# Patient Record
Sex: FEMALE | Race: WHITE | NOT HISPANIC OR LATINO | Employment: FULL TIME | URBAN - NONMETROPOLITAN AREA
[De-identification: names, ages, dates, MRNs, and addresses within clinical notes are randomized per-mention and may not be internally consistent; named-entity substitution may affect disease eponyms.]

---

## 2023-03-17 ENCOUNTER — HOSPITAL ENCOUNTER (EMERGENCY)
Facility: HOSPITAL | Age: 28
Discharge: HOME | End: 2023-03-18
Attending: STUDENT IN AN ORGANIZED HEALTH CARE EDUCATION/TRAINING PROGRAM

## 2023-03-17 ENCOUNTER — APPOINTMENT (EMERGENCY)
Dept: CT IMAGING | Facility: HOSPITAL | Age: 28
End: 2023-03-17

## 2023-03-17 DIAGNOSIS — N93.9 VAGINAL BLEEDING: Primary | ICD-10-CM

## 2023-03-17 LAB
ABO GROUP BLD: NORMAL
ABO GROUP BLD: NORMAL
ALBUMIN SERPL BCP-MCNC: 3.4 G/DL (ref 3.5–5)
ALP SERPL-CCNC: 97 U/L (ref 34–104)
ALT SERPL W P-5'-P-CCNC: 20 U/L (ref 7–52)
ANION GAP SERPL CALCULATED.3IONS-SCNC: 7 MMOL/L (ref 4–13)
ANISOCYTOSIS BLD QL SMEAR: PRESENT
AST SERPL W P-5'-P-CCNC: 23 U/L (ref 13–39)
BASOPHILS # BLD MANUAL: 0 THOUSAND/UL (ref 0–0.1)
BASOPHILS NFR MAR MANUAL: 0 % (ref 0–1)
BILIRUB SERPL-MCNC: 0.21 MG/DL (ref 0.2–1)
BLD GP AB SCN SERPL QL: NEGATIVE
BNP SERPL-MCNC: 19 PG/ML (ref 0–100)
BUN SERPL-MCNC: 13 MG/DL (ref 5–25)
CALCIUM ALBUM COR SERPL-MCNC: 9 MG/DL (ref 8.3–10.1)
CALCIUM SERPL-MCNC: 8.5 MG/DL (ref 8.4–10.2)
CARDIAC TROPONIN I PNL SERPL HS: 2 NG/L (ref 8–18)
CHLORIDE SERPL-SCNC: 108 MMOL/L (ref 96–108)
CO2 SERPL-SCNC: 23 MMOL/L (ref 21–32)
CREAT SERPL-MCNC: 0.59 MG/DL (ref 0.6–1.3)
CRP SERPL QL: 22.5 MG/L
EOSINOPHIL # BLD MANUAL: 0.48 THOUSAND/UL (ref 0–0.4)
EOSINOPHIL NFR BLD MANUAL: 4 % (ref 0–6)
ERYTHROCYTE [DISTWIDTH] IN BLOOD BY AUTOMATED COUNT: 13.2 % (ref 11.6–15.1)
ERYTHROCYTE [SEDIMENTATION RATE] IN BLOOD: 61 MM/HOUR (ref 0–19)
GFR SERPL CREATININE-BSD FRML MDRD: 127 ML/MIN/1.73SQ M
GLUCOSE SERPL-MCNC: 91 MG/DL (ref 65–140)
HCT VFR BLD AUTO: 28.5 % (ref 34.8–46.1)
HGB BLD-MCNC: 9 G/DL (ref 11.5–15.4)
LACTATE SERPL-SCNC: 1 MMOL/L (ref 0.5–2)
LYMPHOCYTES # BLD AUTO: 29 % (ref 14–44)
LYMPHOCYTES # BLD AUTO: 3.45 THOUSAND/UL (ref 0.6–4.47)
MACROCYTES BLD QL AUTO: PRESENT
MCH RBC QN AUTO: 31.8 PG (ref 26.8–34.3)
MCHC RBC AUTO-ENTMCNC: 31.6 G/DL (ref 31.4–37.4)
MCV RBC AUTO: 101 FL (ref 82–98)
MONOCYTES # BLD AUTO: 0.36 THOUSAND/UL (ref 0–1.22)
MONOCYTES NFR BLD: 3 % (ref 4–12)
NEUTROPHILS # BLD MANUAL: 7.61 THOUSAND/UL (ref 1.85–7.62)
NEUTS BAND NFR BLD MANUAL: 1 % (ref 0–8)
NEUTS SEG NFR BLD AUTO: 63 % (ref 43–75)
PLATELET # BLD AUTO: 565 THOUSANDS/UL (ref 149–390)
PLATELET BLD QL SMEAR: ABNORMAL
PMV BLD AUTO: 8.9 FL (ref 8.9–12.7)
POLYCHROMASIA BLD QL SMEAR: PRESENT
POTASSIUM SERPL-SCNC: 4.1 MMOL/L (ref 3.5–5.3)
PROT SERPL-MCNC: 6.1 G/DL (ref 6.4–8.4)
RBC # BLD AUTO: 2.83 MILLION/UL (ref 3.81–5.12)
RBC MORPH BLD: PRESENT
RH BLD: POSITIVE
RH BLD: POSITIVE
SODIUM SERPL-SCNC: 138 MMOL/L (ref 135–147)
SPECIMEN EXPIRATION DATE: NORMAL
WBC # BLD AUTO: 11.89 THOUSAND/UL (ref 4.31–10.16)

## 2023-03-17 RX ORDER — SODIUM CHLORIDE, SODIUM GLUCONATE, SODIUM ACETATE, POTASSIUM CHLORIDE, MAGNESIUM CHLORIDE, SODIUM PHOSPHATE, DIBASIC, AND POTASSIUM PHOSPHATE .53; .5; .37; .037; .03; .012; .00082 G/100ML; G/100ML; G/100ML; G/100ML; G/100ML; G/100ML; G/100ML
1000 INJECTION, SOLUTION INTRAVENOUS ONCE
Status: COMPLETED | OUTPATIENT
Start: 2023-03-17 | End: 2023-03-17

## 2023-03-17 RX ADMIN — IOHEXOL 100 ML: 350 INJECTION, SOLUTION INTRAVENOUS at 22:59

## 2023-03-17 RX ADMIN — SODIUM CHLORIDE, SODIUM GLUCONATE, SODIUM ACETATE, POTASSIUM CHLORIDE, MAGNESIUM CHLORIDE, SODIUM PHOSPHATE, DIBASIC, AND POTASSIUM PHOSPHATE 1000 ML: .53; .5; .37; .037; .03; .012; .00082 INJECTION, SOLUTION INTRAVENOUS at 21:51

## 2023-03-18 VITALS
TEMPERATURE: 97 F | HEART RATE: 101 BPM | DIASTOLIC BLOOD PRESSURE: 75 MMHG | SYSTOLIC BLOOD PRESSURE: 119 MMHG | WEIGHT: 162.04 LBS | OXYGEN SATURATION: 99 % | RESPIRATION RATE: 15 BRPM

## 2023-03-18 NOTE — ED CARE HANDOFF
Emergency Department Sign Out Note        Sign out and transfer of care from Dr Natalya Traore  See Separate Emergency Department note  The patient, Jasmeet Houser, was evaluated by the previous provider for vaginal bleeding, dyspnea on exertion, 1 week postpartum  Workup Completed:  Initial evaluation and lab work    ED Course / Workup Pending (followup): Patient remains mildly tachycardic, awaiting results of CT PE study which is now resulted negative  Will transfer patient for OB triage at [facility] where she receives her OB/GYN care  CT pe study w abdomen pelvis w contrast   Final Result         Post gravid appearance of the uterus  Few droplets of air are seen within the endometrial cavity; is this endometritis or is this the normal post gravid, post  appearance of this patient's uterus? Recommend gynecologic consultation  2 mm nonobstructing stone the right renal collecting system        Workstation performed: [id number]             Results for orders placed or performed during the hospital encounter of 23   CBC and differential   Result Value Ref Range    WBC 11 89 (H) 4 31 - 10 16 Thousand/uL    RBC 2 83 (L) 3 81 - 5 12 Million/uL    Hemoglobin 9 0 (L) 11 5 - 15 4 g/dL    Hematocrit 28 5 (L) 34 8 - 46 1 %     (H) 82 - 98 fL    MCH 31 8 26 8 - 34 3 pg    MCHC 31 6 31 4 - 37 4 g/dL    RDW 13 2 11 6 - 15 1 %    MPV 8 9 8 9 - 12 7 fL    Platelets 567 (H) 005 - 390 Thousands/uL   High Sensitivity Troponin I Random   Result Value Ref Range    HS TnI random 2 (L) 8 - 18 ng/L   Comprehensive metabolic panel   Result Value Ref Range    Sodium 138 135 - 147 mmol/L    Potassium 4 1 3 5 - 5 3 mmol/L    Chloride 108 96 - 108 mmol/L    CO2 23 21 - 32 mmol/L    ANION GAP 7 4 - 13 mmol/L    BUN 13 5 - 25 mg/dL    Creatinine 0 59 (L) 0 60 - 1 30 mg/dL    Glucose 91 65 - 140 mg/dL    Calcium 8 5 8 4 - 10 2 mg/dL    Corrected Calcium 9 0 8 3 - 10 1 mg/dL    AST 23 13 - 39 U/L ALT 20 7 - 52 U/L    Alkaline Phosphatase 97 34 - 104 U/L    Total Protein 6 1 (L) 6 4 - 8 4 g/dL    Albumin 3 4 (L) 3 5 - 5 0 g/dL    Total Bilirubin 0 21 0 20 - 1 00 mg/dL    eGFR 127 ml/min/1 73sq m   B-Type Natriuretic Peptide(BNP)   Result Value Ref Range    BNP 19 0 - 100 pg/mL   C-reactive protein   Result Value Ref Range    CRP 22 5 (H) <3 0 mg/L   Sedimentation rate, automated   Result Value Ref Range    Sed Rate 61 (H) 0 - 19 mm/hour   Lactic acid, plasma   Result Value Ref Range    LACTIC ACID 1 0 0 5 - 2 0 mmol/L   Type and screen   Result Value Ref Range    ABO Grouping O     Rh Factor Positive     Antibody Screen Negative     Specimen Expiration Date 20230320    ABORh Recheck - Contact Blood Bank Prior to Collection   Result Value Ref Range    ABO Grouping O     Rh Factor Positive    Manual Differential(PHLEBS Do Not Order)   Result Value Ref Range    Segmented % 63 43 - 75 %    Bands % 1 0 - 8 %    Lymphocytes % 29 14 - 44 %    Monocytes % 3 (L) 4 - 12 %    Eosinophils, % 4 0 - 6 %    Basophils % 0 0 - 1 %    Absolute Neutrophils 7 61 1 85 - 7 62 Thousand/uL    Lymphocytes Absolute 3 45 0 60 - 4 47 Thousand/uL    Monocytes Absolute 0 36 0 00 - 1 22 Thousand/uL    Eosinophils Absolute 0 48 (H) 0 00 - 0 40 Thousand/uL    Basophils Absolute 0 00 0 00 - 0 10 Thousand/uL    Total Counted      RBC Morphology Present     Anisocytosis Present     Macrocytes Present     Polychromasia Present     Platelet Estimate Increased (A) Adequate                                   ED Course as of 03/18/23 0611   Sat Mar 18, 2023   0138 Discussed with OB/GYN at [facility], accepted by Dr Angelica Alcazar for [facility] triage       Procedures  MDM        Disposition  Final diagnoses:   Vaginal bleeding     Time reflects when diagnosis was documented in both MDM as applicable and the Disposition within this note     Time User Action Codes Description Comment    3/18/2023  1:40 AM Danay Nunn Add [N93 9] Vaginal bleeding       ED Disposition     ED Disposition   Discharge    Condition   Stable    Date/Time   Sat Mar 18, 2023  6:11 AM    Comment   Yassine Tobias should be transferred out to [facility]            MD Documentation    [address] Most Recent Value   Patient Condition The patient has been stabilized such that within reasonable medical probability, no material deterioration of the patient condition or the condition of the unborn child(alexsandra) is likely to result from the transfer   Reason for Transfer Level of Care needed not available at this facility   Benefits of Transfer Specialized equipment and/or services available at the receiving facility (Include comment)________________________, Continuity of care   Risks of Transfer Potential for delay in receiving treatment, Potential deterioration of medical condition, Loss of IV, Increased discomfort during transfer, Possible worsening of condition or death during transfer   Accepting Physician Zachariah Boone    (Name & Tel number) Celestino Trinidad   [facility]   Provider Certification General risk, such as traffic hazards, adverse weather conditions, rough terrain or turbulence, possible failure of equipment (including vehicle or aircraft), or consequences of actions of persons outside the control of the transport personnel, Unanticipated needs of medical equipment and personnel during transport, Risk of worsening condition, The possibility of a transport vehicle being unavailable      RN Documentation    Flowsheet Row Most [address] [facility] Name, Zachariah Yi    (Name & Tel number) Celestino Trinidad      Follow-up Information    None       Patient's Medications    No medications on file     No discharge procedures on file         ED Provider  Electronically Signed by     Bessie Dao MD  03/18/23 0143      Addendum made at 6:10 AM  No transport by EMS still available  Patient requesting to go by private vehicle to [facility]   Partner willing to drive patient  Discussed with transfer center who communicated with [facility]   They are agreeable with patient going by private vehicle although the patient would need to be discharged from this facility and not transferred  Discussed this with patient and patient's partner who remain agreeable to go by private vehicle  Patient remained stable at this time for transport by private vehicle    Patient will be discharged for private transport to [facility] Arianna Clarke MD  03/18/23 7945

## 2023-03-18 NOTE — EMTALA/ACUTE CARE TRANSFER
[address]  [facility] [address]  Dept: [phone]      EMTALA TRANSFER CONSENT    NAME Radha Tobias                                         1995                              MRN [id number]    I have been informed of my rights regarding examination, treatment, and transfer   by Dr Manda Hernandez MD    Benefits: Specialized equipment and/or services available at the receiving facility (Include comment)________________________, Continuity of care    Risks: Potential for delay in receiving treatment, Potential deterioration of medical condition, Loss of IV, Increased discomfort during transfer, Possible worsening of condition or death during transfer      Consent for Transfer:  I acknowledge that my medical condition has been evaluated and explained to me by the emergency department physician or other qualified medical person and/or my attending physician, who has recommended that I be transferred to the service of  Accepting Physician: Dr David Licona at [facility] Name, [address] : [employer]  The above potential benefits of such transfer, the potential risks associated with such transfer, and the probable risks of not being transferred have been explained to me, and I fully understand them  The doctor has explained that, in my case, the benefits of transfer outweigh the risks  I agree to be transferred  I authorize the performance of emergency medical procedures and treatments upon me in both transit and upon arrival at the receiving facility  Additionally, I authorize the release of any and all medical records to the receiving facility and request they be transported with me, if possible  I understand that the safest mode of transportation during a medical emergency is an ambulance and that the Hospital advocates the use of this mode of transport   Risks of traveling to the receiving facility by car, including absence of medical control, life sustaining equipment, such as oxygen, and medical personnel has been explained to me and I fully understand them  (JONATHON CORRECT BOX BELOW)  [X]  I consent to the stated transfer and to be transported by ambulance/helicopter  [  ]  I consent to the stated transfer, but refuse transportation by ambulance and accept full responsibility for my transportation by car  I understand the risks of non-ambulance transfers and I exonerate the Hospital and its staff from any deterioration in my condition that results from this refusal     X___________________________________________    DATE  23  TIME________  Signature of patient or legally responsible individual signing on patient behalf           RELATIONSHIP TO PATIENT_________________________          Provider Certification    NAME Yandel Tobias                                        [facility] 1995                              MRN [id number]    A medical screening exam was performed on the above named patient  Based on the examination:    Condition Necessitating Transfer The encounter diagnosis was Vaginal bleeding      Patient Condition: The patient has been stabilized such that within reasonable medical probability, no material deterioration of the patient condition or the condition of the unborn child(alexsandra) is likely to result from the transfer    Reason for Transfer: Level of Care needed not available at this facility    Transfer Requirements: [address]   · Space available and qualified personnel available for treatment as acknowledged by Fco Leonard  · Agreed to accept transfer and to provide appropriate medical treatment as acknowledged by       Dr John Garcia  · Appropriate medical records of the examination and treatment of the patient are provided at the time of transfer   [address] _______  · Transfer will be performed by qualified personnel from    and appropriate transfer equipment as required, including the use of necessary and appropriate life support measures  Provider Certification: I have examined the patient and explained the following risks and benefits of being transferred/refusing transfer to the patient/family:  General risk, such as traffic hazards, adverse weather conditions, rough terrain or turbulence, possible failure of equipment (including vehicle or aircraft), or consequences of actions of persons outside the control of the transport personnel, Unanticipated needs of medical equipment and personnel during transport, Risk of worsening condition, The possibility of a transport vehicle being unavailable      Based on these reasonable risks and benefits to the patient and/or the unborn child(alexsandra), and based upon the information available at the time of the patient’s examination, I certify that the medical benefits reasonably to be expected from the provision of appropriate medical treatments at another medical facility outweigh the increasing risks, if any, to the individual’s medical condition, and in the case of labor to the unborn child, from effecting the transfer      X____________________________________________ DATE 03/18/23        TIME_______      ORIGINAL - SEND TO MEDICAL RECORDS   COPY - SEND WITH PATIENT DURING TRANSFER

## 2023-03-18 NOTE — ED PROVIDER NOTES
History  Chief Complaint   Patient presents with   • Weakness - Generalized     Pt reports she is one week post partum    Reports increased weakness, MCGUIRE today  Pt reports vaginal bleeding since dc, reports approx 3 pads/day        History provided by:  Patient  Vaginal Bleeding  Quality:  Bright red  Severity:  Moderate  Onset quality:  Gradual  Duration:  1 week  Timing:  Intermittent  Progression:  Waxing and waning  Chronicity:  New  Number of pads used:  3/day  Possible pregnancy: no    Context: spontaneously    Relieved by:  None tried  Worsened by:  Nothing  Ineffective treatments:  None tried  Associated symptoms: abdominal pain, dizziness and fatigue    Associated symptoms: no dysuria, no fever, no nausea and no vaginal discharge       26-year-old female  G1, P1  Underwent  section 1 week ago  Complicated by postpartum hemorrhage  Received transfusion  Has been having vaginal bleeding since discharge from the hospital--approximately 3 pads per day  Over the past 24 hours, the patient has been having dyspnea with exertion, generalized weakness  Denies fever/chills, vaginal discharge  Tachycardic upon arrival     History reviewed  No pertinent past medical history  Past Surgical History:   Procedure Laterality Date   • ABDOMINAL SURGERY         History reviewed  No pertinent family history  I have reviewed and agree with the history as documented  E-Cigarette/Vaping     E-Cigarette/Vaping Substances     Social History     Tobacco Use   • Smoking status: Never   • Smokeless tobacco: Never   Substance Use Topics   • Alcohol use: Never   • Drug use: Never     Review of Systems   Constitutional: Positive for activity change, appetite change and fatigue  Negative for fever  HENT: Negative for congestion, rhinorrhea, sinus pressure, sinus pain and sore throat  Eyes: Negative for photophobia, discharge, redness and itching  Respiratory: Positive for shortness of breath   Negative for cough, chest tightness and wheezing  Cardiovascular: Negative for chest pain and palpitations  Gastrointestinal: Positive for abdominal pain  Negative for abdominal distention, diarrhea, nausea and vomiting  Genitourinary: Positive for pelvic pain and vaginal bleeding  Negative for decreased urine volume, difficulty urinating, dysuria, flank pain, frequency, urgency, vaginal discharge and vaginal pain  Musculoskeletal: Negative for arthralgias, myalgias, neck pain and neck stiffness  Skin: Negative for color change, pallor, rash and wound  Neurological: Positive for dizziness and light-headedness  Negative for syncope, weakness and headaches  Hematological: Does not bruise/bleed easily  Psychiatric/Behavioral: Negative for confusion  All other systems reviewed and are negative  Physical Exam  Physical Exam  Vitals and nursing note reviewed  Constitutional:       General: She is not in acute distress  Appearance: She is not ill-appearing or toxic-appearing  HENT:      Head: Normocephalic and atraumatic  Right Ear: External ear normal       Left Ear: External ear normal       Nose: No congestion or rhinorrhea  Mouth/Throat:      Mouth: Mucous membranes are moist       Pharynx: Oropharynx is clear  No oropharyngeal exudate or posterior oropharyngeal erythema  Eyes:      General: No scleral icterus  Right eye: No discharge  Left eye: No discharge  Extraocular Movements: Extraocular movements intact  Conjunctiva/sclera: Conjunctivae normal       Pupils: Pupils are equal, round, and reactive to light  Cardiovascular:      Rate and Rhythm: Regular rhythm  Tachycardia present  Pulses: Normal pulses  Heart sounds: Normal heart sounds  No murmur heard  Pulmonary:      Effort: Pulmonary effort is normal  No respiratory distress  Breath sounds: Normal breath sounds  No stridor  No wheezing, rhonchi or rales     Chest:      Chest wall: No tenderness  Abdominal:      General: Abdomen is flat  Bowel sounds are normal  There is no distension  Palpations: Abdomen is soft  There is no mass  Tenderness: There is abdominal tenderness  There is no right CVA tenderness, left CVA tenderness, guarding or rebound  Hernia: No hernia is present  Comments: TTP along the lower abdomen  No rebound or guarding  Normoactive bowel sounds  Musculoskeletal:         General: No swelling, tenderness, deformity or signs of injury  Cervical back: Neck supple  No tenderness  Right lower leg: No edema  Left lower leg: No edema  Skin:     General: Skin is warm and dry  Capillary Refill: Capillary refill takes less than 2 seconds  Coloration: Skin is pale  Skin is not jaundiced  Findings: No bruising, erythema, lesion or rash  Neurological:      General: No focal deficit present  Mental Status: She is alert and oriented to person, place, and time  Cranial Nerves: No cranial nerve deficit  Sensory: No sensory deficit  Motor: No weakness  Coordination: Coordination normal    Psychiatric:         Mood and Affect: Mood normal          Behavior: Behavior normal          Thought Content:  Thought content normal          Judgment: Judgment normal        Vital Signs  ED Triage Vitals [03/17/23 2102]   Temperature Pulse Respirations Blood Pressure SpO2   (!) 97 °F (36 1 °C) (!) 120 16 140/92 98 %      Temp src Heart Rate Source Patient Position - Orthostatic VS BP Location FiO2 (%)   -- -- Lying Right arm --      Pain Score       No Pain         Vitals:    03/17/23 2102   BP: 140/92   Pulse: (!) 120   Patient Position - Orthostatic VS: Lying     ED Medications  Medications   multi-electrolyte (ISOLYTE-S PH 7 4) bolus 1,000 mL (0 mL Intravenous Stopped 3/17/23 2251)   iohexol (OMNIPAQUE) 350 MG/ML injection (SINGLE-DOSE) 100 mL (100 mL Intravenous Given 3/17/23 2259)     Diagnostic Studies  Results Reviewed Procedure Component Value Units Date/Time    B-Type Natriuretic Peptide(BNP) [[id number]]  (Normal) Collected: 03/17/23 2149    Lab Status: Final result Specimen: Blood from Arm, Left Updated: 03/17/23 2231     BNP 19 pg/mL     High Sensitivity Troponin I Random [[id number]]  (Abnormal) Collected: 03/17/23 2149    Lab Status: Final result Specimen: Blood from Arm, Left Updated: 03/17/23 2229     HS TnI random 2 ng/L     Manual Differential(PHLEBS Do Not Order) [[id number]]  (Abnormal) Collected: 03/17/23 2149    Lab Status: Final result Specimen: Blood from Arm, Left Updated: 03/17/23 2224     Segmented % 63 %      Bands % 1 %      Lymphocytes % 29 %      Monocytes % 3 %      Eosinophils, % 4 %      Basophils % 0 %      Absolute Neutrophils 7 61 Thousand/uL      Lymphocytes Absolute 3 45 Thousand/uL      Monocytes Absolute 0 36 Thousand/uL      Eosinophils Absolute 0 48 Thousand/uL      Basophils Absolute 0 00 Thousand/uL      Total Counted --     RBC Morphology Present     Anisocytosis Present     Macrocytes Present     Polychromasia Present     Platelet Estimate Increased    Comprehensive metabolic panel [[id number]]  (Abnormal) Collected: 03/17/23 2150    Lab Status: Final result Specimen: Blood from Arm, Left Updated: 03/17/23 2221     Sodium 138 mmol/L      Potassium 4 1 mmol/L      Chloride 108 mmol/L      CO2 23 mmol/L      ANION GAP 7 mmol/L      BUN 13 mg/dL      Creatinine 0 59 mg/dL      Glucose 91 mg/dL      Calcium 8 5 mg/dL      Corrected Calcium 9 0 mg/dL      AST 23 U/L      ALT 20 U/L      Alkaline Phosphatase 97 U/L      Total Protein 6 1 g/dL      Albumin 3 4 g/dL      Total Bilirubin 0 21 mg/dL      eGFR 127 ml/min/1 73sq m     Narrative:      [place] guidelines for Chronic Kidney Disease (CKD):   •  Stage 1 with normal or high GFR (GFR > 90 mL/min/1 73 square meters)  •  Stage 2 Mild CKD (GFR = 60-89 mL/min/1 73 square meters)  •  Stage 3A Moderate CKD (GFR = 45-59 mL/min/1 73 square meters)  •  Stage 3B Moderate CKD (GFR = 30-44 mL/min/1 73 square meters)  •  Stage 4 Severe CKD (GFR = 15-29 mL/min/1 73 square meters)  •  Stage 5 End Stage CKD (GFR <15 mL/min/1 73 square meters)  Note: GFR calculation is accurate only with a steady state creatinine    C-reactive protein [[id number]]  (Abnormal) Collected: 23    Lab Status: Final result Specimen: Blood from Arm, Left Updated: 23     CRP 22 5 mg/L     Lactic acid, plasma [[id number]]  (Normal) Collected: 23    Lab Status: Final result Specimen: Blood from Arm, Left Updated: 23     LACTIC ACID 1 0 mmol/L     Narrative:      Result may be elevated if tourniquet was used during collection  Sedimentation rate, automated [[id number]]  (Abnormal) Collected: 23    Lab Status: Final result Specimen: Blood from Arm, Left Updated: 23     Sed Rate 61 mm/hour     CBC and differential [[id number]]  (Abnormal) Collected: 23    Lab Status: Final result Specimen: Blood from Arm, Left Updated: 23     WBC 11 89 Thousand/uL      RBC 2 83 Million/uL      Hemoglobin 9 0 g/dL      Hematocrit 28 5 %       fL      MCH 31 8 pg      MCHC 31 6 g/dL      RDW 13 2 %      MPV 8 9 fL      Platelets 324 Thousands/uL              CT pe study w abdomen pelvis w contrast    (Results Pending)          Procedures  Procedures     ED Course     Medical Decision Making  The differential diagnoses include but are not limited to pulmonary embolism, endometritis, dehydration  79-year-old female  Recently postpartum  Status post  section with complication  Received transfusion  Having increased dyspnea with exertion, lower abdominal pain, vaginal bleeding  Tachycardic to the 120s upon arrival   Physical exam findings significant for lower abdominal tenderness to palpation  Work-up sig mild leukocytosis, mildly elevated inflammatory markers  CT imaging is pending  The case was signed out to Dr Marylene Silos  Plan discussed  Amount and/or Complexity of Data Reviewed  Independent Historian: spouse  External Data Reviewed: labs  Labs: ordered  Decision-making details documented in ED Course  Radiology: ordered  Risk  Prescription drug management  Disposition  Final diagnoses:   None     ED Disposition     None      Follow-up Information    None         Patient's Medications    No medications on file       No discharge procedures on file      PDMP Review     None          ED Provider  Electronically Signed by           Megan Mendiola DO  03/18/23 7179

## 2024-06-25 ENCOUNTER — OFFICE VISIT (OUTPATIENT)
Dept: URGENT CARE | Facility: CLINIC | Age: 29
End: 2024-06-25
Payer: COMMERCIAL

## 2024-06-25 VITALS
HEIGHT: 59 IN | TEMPERATURE: 98.4 F | HEART RATE: 91 BPM | BODY MASS INDEX: 27.17 KG/M2 | RESPIRATION RATE: 18 BRPM | DIASTOLIC BLOOD PRESSURE: 67 MMHG | WEIGHT: 134.8 LBS | SYSTOLIC BLOOD PRESSURE: 115 MMHG | OXYGEN SATURATION: 98 %

## 2024-06-25 DIAGNOSIS — T78.40XA ALLERGIC REACTION, INITIAL ENCOUNTER: Primary | ICD-10-CM

## 2024-06-25 PROCEDURE — S9083 URGENT CARE CENTER GLOBAL: HCPCS

## 2024-06-25 PROCEDURE — G0382 LEV 3 HOSP TYPE B ED VISIT: HCPCS

## 2024-06-25 RX ORDER — PREDNISONE 20 MG/1
60 TABLET ORAL DAILY
Qty: 15 TABLET | Refills: 0 | Status: SHIPPED | OUTPATIENT
Start: 2024-06-25 | End: 2024-06-30

## 2024-06-25 RX ORDER — LEVOTHYROXINE SODIUM 0.07 MG/1
75 TABLET ORAL DAILY
COMMUNITY
Start: 2024-03-04

## 2024-06-25 RX ORDER — NAPROXEN 500 MG/1
500 TABLET ORAL 2 TIMES DAILY WITH MEALS
Qty: 14 TABLET | Refills: 0 | Status: SHIPPED | OUTPATIENT
Start: 2024-06-25 | End: 2024-07-02

## 2024-06-25 RX ORDER — LORATADINE 10 MG/1
10 TABLET ORAL DAILY
COMMUNITY

## 2024-06-25 RX ORDER — FLUTICASONE PROPIONATE 50 MCG
SPRAY, SUSPENSION (ML) NASAL
COMMUNITY
Start: 2024-05-22

## 2024-06-25 NOTE — PATIENT INSTRUCTIONS
Take prednisone and naprosyn as prescribed  Do not take any other NSAID's with naprosyn - ok to take tylenol  Claritin (once a day) and zantac (twice a day) over the counter  Benadryl as needed at night (do not drive or operate heavy machinery after taking)  Follow up with Allergy specialist    Follow up with PCP in 3-5 days.  Proceed to  ER if symptoms worsen.    If tests are performed, our office will contact you with results only if changes need to made to the care plan discussed with you at the visit. You can review your full results on [facility].    General Allergic Reaction   WHAT YOU NEED TO KNOW:   An allergic reaction is your body's response to an allergen. Allergens include medicines, food, insect stings, animal dander, mold, latex, chemicals, and dust mites. Pollen from trees, grass, and weeds can also cause an allergic reaction. An allergic reaction can range from mild to severe.  DISCHARGE INSTRUCTIONS:   Call 911 for signs or symptoms of anaphylaxis,  such as trouble breathing, swelling in your mouth or throat, or wheezing. You may also have itching, a rash, hives, or feel like you are going to faint.  Return to the emergency department if:   You have a skin rash, hives, swelling, or itching that is starting to get worse.    Your throat tightens, or your lips or tongue swell.    You have trouble swallowing or speaking.    You have worsening nausea, diarrhea, or abdominal cramps, or you are vomiting.    You have chest pain or tightness.    Contact your healthcare provider if:   You have questions or concerns about your condition or care.      Medicines:  You may need any of the following:  Medicines  may be given to relieve certain allergy symptoms such as itching, sneezing, and swelling. You may take them as a pill or use drops in your nose or eyes. Topical treatments may be given to put directly on your skin to help decrease itching or swelling.    Epinephrine  may be prescribed if you are at  risk for anaphylaxis. This is a severe allergic reaction that can be life-threatening. Your healthcare provider will tell you if you need to keep epinephrine with you. You will be taught when and how to use it.    Take your medicine as directed.  Contact your healthcare provider if you think your medicine is not helping or if you have side effects. Tell your provider if you are allergic to any medicine. Keep a list of the medicines, vitamins, and herbs you take. Include the amounts, and when and why you take them. Bring the list or the pill bottles to follow-up visits. Carry your medicine list with you in case of an emergency.    Follow up with your doctor as directed:  Write down your questions so you remember to ask them during your visits.  Manage your symptoms:   Avoid allergens.  You may need to have allergy testing with your healthcare provider or a specialist to find your allergens.    Use cold compresses on your skin or eyes.  This will help soothe skin or eyes affected by the allergic reaction. You can make a cold compress by soaking a washcloth in cool water. Wring out the extra water before you apply the washcloth.    Rinse your nasal passages with a saline solution.  Daily rinsing may help clear allergens out of your nose. Use distilled water if possible. You can also boil tap water and then let it cool before you use it. Do not use tap water without boiling it first.    Do not smoke.  Nicotine and other chemicals in cigarettes and cigars can make an allergic reaction worse, and can also cause lung damage. Ask your healthcare provider for information if you currently smoke and need help to quit. E-cigarettes or smokeless tobacco still contain nicotine. Talk to your healthcare provider before you use these products.    © Copyright [employer] 2023 Information is for End User's use only and may not be sold, redistributed or otherwise used for commercial purposes.  The above information is an   only. It is not intended as medical advice for individual conditions or treatments. Talk to your doctor, nurse or pharmacist before following any medical regimen to see if it is safe and effective for you.

## 2024-06-25 NOTE — PROGRESS NOTES
[facility]        NAME: Kelle Tobias is a 29 y.o. female  : 1995    MRN: [id number]  DATE: 2024  TIME: 10:16 AM    Assessment and Plan   Allergic reaction, initial encounter [T78.40XA]  1. Allergic reaction, initial encounter  predniSONE 20 mg tablet    naproxen (Naprosyn) 500 mg tablet        Will treat with prednisone and naprosyn  Supportive care reivewed - red flags discussed of when to proceed to ED (SOB, Chest pain, worsening swelling)  Follow up with PCP    Patient Instructions     Take prednisone and naprosyn as prescribed  Do not take any other NSAID's with naprosyn - ok to take tylenol  Claritin (once a day) and zantac (twice a day) over the counter  Benadryl as needed at night (do not drive or operate heavy machinery after taking)  Follow up with Allergy specialist    Follow up with PCP in 3-5 days.  Proceed to  ER if symptoms worsen.    If tests are performed, our office will contact you with results only if changes need to made to the care plan discussed with you at the visit. [facility]    Chief Complaint     Chief Complaint   Patient presents with    Oral Swelling     Lips swelling x 4 days . No SOB. No recent changes in products          History of Present Illness       29-year-old female arrives reporting upper and lower lip swelling that has remained the same in size over the past 4 days.  Patient reports the only new food that she ate was mangoes, and she has not eaten them in a long time.  Patient believes she had an allergic reaction to mangoes.  Patient denies any new medications, soaps, detergents, or make-up products.  Patient denies any shortness of breath, chest pain or abdominal pain.  Patient reports that her lips are swollen and sometimes can feel burning.  Patient reports she has been using Aquaphor without any relief.         Review of Systems   Review of Systems   Constitutional:  Negative for chills and  "fever.   HENT:  Positive for mouth sores (lip swelling upper and lower).         Bilateral lip swelling   Eyes:  Negative for visual disturbance.   Respiratory:  Negative for cough and shortness of breath.    Cardiovascular:  Negative for chest pain.   Gastrointestinal:  Negative for abdominal pain.   Skin:  Positive for color change.   Neurological:  Negative for dizziness and headaches.         Current Medications       Current Outpatient Medications:     fluticasone (FLONASE) 50 mcg/act nasal spray, , Disp: , Rfl:     levothyroxine 75 mcg tablet, Take 75 mcg by mouth daily, Disp: , Rfl:     loratadine (CLARITIN) 10 mg tablet, Take 10 mg by mouth daily, Disp: , Rfl:     naproxen (Naprosyn) 500 mg tablet, Take 1 tablet (500 mg total) by mouth 2 (two) times a day with meals for 7 days, Disp: 14 tablet, Rfl: 0    predniSONE 20 mg tablet, Take 3 tablets (60 mg total) by mouth daily for 5 days, Disp: 15 tablet, Rfl: 0    sertraline (ZOLOFT) 50 mg tablet, Take 50 mg by mouth daily, Disp: , Rfl:     Current Allergies     Allergies as of 06/25/2024 - Reviewed 06/25/2024   Allergen Reaction Noted    Nuts - food allergy Itching 03/17/2023    Shellfish-derived products - food allergy Itching 03/17/2023    Soybean-containing drug products - food allergy Itching 03/17/2023            The following portions of the patient's history were reviewed and updated as appropriate: allergies, current medications, past family history, past medical history, past social history, past surgical history and problem list.     Past Medical History:   Diagnosis Date    Thyroid disease        Past Surgical History:   Procedure Laterality Date    ABDOMINAL SURGERY         History reviewed. No pertinent family history.      Medications have been verified.        Objective   /67   Pulse 91   Temp 98.4 °F (36.9 °C) (Tympanic)   Resp 18   Ht 4' 11\" (1.499 m)   Wt 61.1 kg (134 lb 12.8 oz)   LMP 05/31/2024 Comment: Trying to become pregant  " SpO2 98%   BMI 27.23 kg/m²        Physical Exam     Physical Exam  Vitals and nursing note reviewed.   Constitutional:       General: She is not in acute distress.     Appearance: Normal appearance. She is not ill-appearing.   HENT:      Head: Normocephalic.      Right Ear: External ear normal.      Left Ear: External ear normal.      Nose: Nose normal.      Mouth/Throat:      Mouth: Mucous membranes are moist.      Comments: Upper and lower lip edema  Eyes:      Pupils: Pupils are equal, round, and reactive to light.   Cardiovascular:      Rate and Rhythm: Normal rate and regular rhythm.      Pulses: Normal pulses.      Heart sounds: Normal heart sounds.   Pulmonary:      Effort: Pulmonary effort is normal. No respiratory distress.      Breath sounds: Normal breath sounds. No stridor. No wheezing, rhonchi or rales.   Chest:      Chest wall: No tenderness.   Musculoskeletal:         General: Normal range of motion.      Cervical back: Normal range of motion and neck supple.   Lymphadenopathy:      Cervical: No cervical adenopathy.   Skin:     General: Skin is warm and dry.      Capillary Refill: Capillary refill takes less than 2 seconds.   Neurological:      General: No focal deficit present.      Mental Status: She is alert and oriented to person, place, and time.   Psychiatric:         Mood and Affect: Mood normal.         Behavior: Behavior normal.

## 2025-02-26 ENCOUNTER — HOSPITAL ENCOUNTER (EMERGENCY)
Facility: HOSPITAL | Age: 30
Discharge: HOME | End: 2025-02-26
Attending: EMERGENCY MEDICINE
Payer: COMMERCIAL

## 2025-02-26 VITALS
SYSTOLIC BLOOD PRESSURE: 122 MMHG | RESPIRATION RATE: 16 BRPM | TEMPERATURE: 97.5 F | HEART RATE: 96 BPM | DIASTOLIC BLOOD PRESSURE: 75 MMHG | OXYGEN SATURATION: 100 %

## 2025-02-26 DIAGNOSIS — R11.2 NAUSEA AND VOMITING: Primary | ICD-10-CM

## 2025-02-26 LAB
ANION GAP SERPL CALCULATED.3IONS-SCNC: 10 MMOL/L (ref 4–13)
BASOPHILS # BLD AUTO: 0.02 THOUSANDS/ÂΜL (ref 0–0.1)
BASOPHILS NFR BLD AUTO: 0 % (ref 0–1)
BUN SERPL-MCNC: 11 MG/DL (ref 5–25)
CALCIUM SERPL-MCNC: 9.3 MG/DL (ref 8.4–10.2)
CHLORIDE SERPL-SCNC: 104 MMOL/L (ref 96–108)
CO2 SERPL-SCNC: 23 MMOL/L (ref 21–32)
CREAT SERPL-MCNC: 0.39 MG/DL (ref 0.6–1.3)
EOSINOPHIL # BLD AUTO: 0.01 THOUSAND/ÂΜL (ref 0–0.61)
EOSINOPHIL NFR BLD AUTO: 0 % (ref 0–6)
ERYTHROCYTE [DISTWIDTH] IN BLOOD BY AUTOMATED COUNT: 12 % (ref 11.6–15.1)
FLUAV AG UPPER RESP QL IA.RAPID: NEGATIVE
FLUBV AG UPPER RESP QL IA.RAPID: NEGATIVE
GFR SERPL CREATININE-BSD FRML MDRD: 144 ML/MIN/1.73SQ M
GLUCOSE SERPL-MCNC: 102 MG/DL (ref 65–140)
HCT VFR BLD AUTO: 39.6 % (ref 34.8–46.1)
HGB BLD-MCNC: 13.1 G/DL (ref 11.5–15.4)
IMM GRANULOCYTES # BLD AUTO: 0.12 THOUSAND/UL (ref 0–0.2)
IMM GRANULOCYTES NFR BLD AUTO: 1 % (ref 0–2)
LG PLATELETS BLD QL SMEAR: PRESENT
LYMPHOCYTES # BLD AUTO: 0.44 THOUSANDS/ÂΜL (ref 0.6–4.47)
LYMPHOCYTES NFR BLD AUTO: 3 % (ref 14–44)
MCH RBC QN AUTO: 31 PG (ref 26.8–34.3)
MCHC RBC AUTO-ENTMCNC: 33.1 G/DL (ref 31.4–37.4)
MCV RBC AUTO: 94 FL (ref 82–98)
MONOCYTES # BLD AUTO: 0.33 THOUSAND/ÂΜL (ref 0.17–1.22)
MONOCYTES NFR BLD AUTO: 2 % (ref 4–12)
NEUTROPHILS # BLD AUTO: 13.15 THOUSANDS/ÂΜL (ref 1.85–7.62)
NEUTS SEG NFR BLD AUTO: 94 % (ref 43–75)
NRBC BLD AUTO-RTO: 0 /100 WBCS
PLATELET # BLD AUTO: 293 THOUSANDS/UL (ref 149–390)
PLATELET BLD QL SMEAR: ADEQUATE
PMV BLD AUTO: 9.3 FL (ref 8.9–12.7)
POLYCHROMASIA BLD QL SMEAR: PRESENT
POTASSIUM SERPL-SCNC: 4 MMOL/L (ref 3.5–5.3)
RBC # BLD AUTO: 4.22 MILLION/UL (ref 3.81–5.12)
RBC MORPH BLD: PRESENT
SARS-COV+SARS-COV-2 AG RESP QL IA.RAPID: NEGATIVE
SODIUM SERPL-SCNC: 137 MMOL/L (ref 135–147)
WBC # BLD AUTO: 14.07 THOUSAND/UL (ref 4.31–10.16)

## 2025-02-26 PROCEDURE — 80048 BASIC METABOLIC PNL TOTAL CA: CPT | Performed by: EMERGENCY MEDICINE

## 2025-02-26 PROCEDURE — 36415 COLL VENOUS BLD VENIPUNCTURE: CPT | Performed by: EMERGENCY MEDICINE

## 2025-02-26 PROCEDURE — 87804 INFLUENZA ASSAY W/OPTIC: CPT | Performed by: EMERGENCY MEDICINE

## 2025-02-26 PROCEDURE — 99285 EMERGENCY DEPT VISIT HI MDM: CPT | Performed by: EMERGENCY MEDICINE

## 2025-02-26 PROCEDURE — 96365 THER/PROPH/DIAG IV INF INIT: CPT

## 2025-02-26 PROCEDURE — 99284 EMERGENCY DEPT VISIT MOD MDM: CPT

## 2025-02-26 PROCEDURE — 87811 SARS-COV-2 COVID19 W/OPTIC: CPT | Performed by: EMERGENCY MEDICINE

## 2025-02-26 PROCEDURE — 96375 TX/PRO/DX INJ NEW DRUG ADDON: CPT

## 2025-02-26 PROCEDURE — 85025 COMPLETE CBC W/AUTO DIFF WBC: CPT | Performed by: EMERGENCY MEDICINE

## 2025-02-26 RX ORDER — ONDANSETRON 4 MG/1
4 TABLET, ORALLY DISINTEGRATING ORAL EVERY 8 HOURS PRN
Qty: 20 TABLET | Refills: 0 | Status: SHIPPED | OUTPATIENT
Start: 2025-02-26

## 2025-02-26 RX ORDER — ONDANSETRON 2 MG/ML
4 INJECTION INTRAMUSCULAR; INTRAVENOUS ONCE
Status: COMPLETED | OUTPATIENT
Start: 2025-02-26 | End: 2025-02-26

## 2025-02-26 RX ADMIN — SODIUM CHLORIDE, SODIUM LACTATE, POTASSIUM CHLORIDE, AND CALCIUM CHLORIDE 1000 ML: .6; .31; .03; .02 INJECTION, SOLUTION INTRAVENOUS at 22:20

## 2025-02-26 RX ADMIN — ONDANSETRON 4 MG: 2 INJECTION INTRAMUSCULAR; INTRAVENOUS at 22:21

## 2025-02-27 NOTE — DISCHARGE INSTRUCTIONS
Take medication as needed and prescribed.  Return with any worsening symptoms.  Follow-up with your obstetrician if no better.    Thank you for choosing the emergency department at [facility]. We appreciated the opportunity and privilege to address your healthcare needs. We remain available to you should you require additional evaluation or assistance. We value your feedback and would appreciate the opportunity to address anything you identified as an opportunity to improve or where we excelled. If there are colleagues who deserve special recognition, please let us know! We hope you are feeling better soon!    Please also note that sometimes there are subtle abnormalities in your lab values that you may observe when you access your record online.  These are frequently not worrisome and if they are of concern we will have discussed them with you.  However, we always encourage that you discuss any concerns you may have or observe on your record with your primary care provider.   Please also note that while your visit documentation was reviewed prior to completion, voice transcription will occasionally recognize words or grammar differently than what was spoken.

## 2025-02-27 NOTE — ED PROVIDER NOTES
Time reflects when diagnosis was documented in both MDM as applicable and the Disposition within this note       Time User Action Codes Description Comment    2/26/2025 11:36 PM Sammy Thurston Add [R11.2] Nausea and vomiting           ED Disposition       ED Disposition   Discharge    Condition   Stable    Date/Time   Wed Feb 26, 2025 11:36 PM    Comment   Kelle Tobias discharge to home/self care.                   Assessment & Plan       Medical Decision Making  Patient presented to the emergency department and a MSE was performed. The patient was evaluated for complaint related to acute nausea and/or vomiting and/or diarrhea.  Patient is potentially at risk for, but not limited to, viral infection, celiac disease, Crohn's disease, irritable bowel syndrome, colitis, malabsorption syndrome, or C. Difficile, or other disease process unrelated to the abdomen which may cause this symptomatology is also considered. Several of these diagnoses have been evaluated and ruled out by history and physical.  As needed, patient will be further evaluated with laboratory and imaging studies.  Higher level diagnostics, such as CT imaging or ultrasound, may also be required.  Please see work-up portion of the note for further evaluation of patient's risk.  Socioeconomic factors were also considered as part of the decision-making process.  Unless otherwise stated in the chart or patient is admitted as elsewhere documented, any previously prescribed medications will be maintained.      Problems Addressed:  Nausea and vomiting: acute illness or injury    Amount and/or Complexity of Data Reviewed  Labs: ordered. Decision-making details documented in ED Course.    Risk  Prescription drug management.        ED Course as of 02/26/25 2338   Wed Feb 26, 2025   2250 WBC(!): 14.07  Normal leukocytosis associated with pregnancy and patient's recent vomiting   2334 Patient reports that she is feeling better.  Her abdomen remains benign.   She is stable for discharge.       Medications   ondansetron (ZOFRAN) injection 4 mg (4 mg Intravenous Given 2/26/25 2221)   lactated ringers bolus 1,000 mL (1,000 mL Intravenous New Bag 2/26/25 2220)       ED Risk Strat Scores                            SBIRT 22yo+      Flowsheet Row Most Recent Value   Initial Alcohol Screen: US AUDIT-C     1. How often do you have a drink containing alcohol? 0 Filed at: 02/26/2025 2143   2. How many drinks containing alcohol do you have on a typical day you are drinking?  0 Filed at: 02/26/2025 2143   3b. FEMALE Any Age, or MALE 65+: How often do you have 4 or more drinks on one occassion? 0 Filed at: 02/26/2025 2143   Audit-C Score 0 Filed at: 02/26/2025 2143   ANCA: How many times in the past year have you...    Used an illegal drug or used a prescription medication for non-medical reasons? Never Filed at: 02/26/2025 2143                            History of Present Illness       Chief Complaint   Patient presents with    Vomiting     Pt reports vomiting that started today; denies any fevers/chills/diarrhea. Pt is also 16 weeks pregnant       Past Medical History:   Diagnosis Date    Thyroid disease       Past Surgical History:   Procedure Laterality Date    ABDOMINAL SURGERY        History reviewed. No pertinent family history.   Social History     Tobacco Use    Smoking status: Never    Smokeless tobacco: Never   Vaping Use    Vaping status: Never Used   Substance Use Topics    Alcohol use: Never    Drug use: Never      E-Cigarette/Vaping    E-Cigarette Use Never User       E-Cigarette/Vaping Substances      I have reviewed and agree with the history as documented.     29-year-old G2 para 1 presenting to the emergency room with nausea and vomiting all day.  Has been also recently ill.  Patient has had congestion.  No diarrhea.  Denies any abdominal pain.  No abdominal cramping.  No vaginal bleeding.  No vaginal leakage of fluids.  No urinary complaints.      History provided  by:  Patient  Vomiting  Associated symptoms: cough and myalgias    Associated symptoms: no abdominal pain, no diarrhea and no sore throat        Review of Systems   HENT:  Positive for congestion. Negative for sore throat.    Respiratory:  Positive for cough.    Gastrointestinal:  Positive for nausea and vomiting. Negative for abdominal pain and diarrhea.   Genitourinary:  Negative for frequency, urgency, vaginal bleeding, vaginal discharge and vaginal pain.   Musculoskeletal:  Positive for myalgias.           Objective       ED Triage Vitals [02/26/25 2141]   Temperature Pulse Blood Pressure Respirations SpO2 Patient Position - Orthostatic VS   97.5 °F (36.4 °C) (!) 116 122/75 16 100 % Sitting      Temp Source Heart Rate Source BP Location FiO2 (%) Pain Score    Temporal Monitor Right arm -- --      Vitals      Date and Time Temp Pulse SpO2 Resp BP Pain Score FACES Pain Rating User   02/26/25 2336 -- 96 -- -- -- -- -- GB   02/26/25 2141 97.5 °F (36.4 °C) 116 100 % 16 122/75 -- -- AS            Physical Exam  Vitals and nursing note reviewed.   Constitutional:       General: She is in acute distress.      Appearance: She is normal weight. She is not ill-appearing or toxic-appearing.   HENT:      Head: Normocephalic and atraumatic.      Right Ear: External ear normal.      Left Ear: External ear normal.      Nose: Nose normal.      Mouth/Throat:      Mouth: Mucous membranes are moist.   Pulmonary:      Effort: Pulmonary effort is normal. No respiratory distress.   Abdominal:      General: Abdomen is flat. There is no distension.      Tenderness: There is no abdominal tenderness. There is no guarding.      Comments: Gravid uterus   Musculoskeletal:         General: No signs of injury.   Skin:     Coloration: Skin is not pale.   Neurological:      General: No focal deficit present.      Mental Status: She is alert.   Psychiatric:         Mood and Affect: Mood normal.         Results Reviewed       Procedure Component  Value Units Date/Time    CBC and differential [[id number]]  (Abnormal) Collected: 02/26/25 2215    Lab Status: Final result Specimen: Blood from Arm, Right Updated: 02/26/25 2334     WBC 14.07 Thousand/uL      RBC 4.22 Million/uL      Hemoglobin 13.1 g/dL      Hematocrit 39.6 %      MCV 94 fL      MCH 31.0 pg      MCHC 33.1 g/dL      RDW 12.0 %      MPV 9.3 fL      Platelets 293 Thousands/uL      nRBC 0 /100 WBCs      Segmented % 94 %      Immature Grans % 1 %      Lymphocytes % 3 %      Monocytes % 2 %      Eosinophils Relative 0 %      Basophils Relative 0 %      Absolute Neutrophils 13.15 Thousands/µL      Absolute Immature Grans 0.12 Thousand/uL      Absolute Lymphocytes 0.44 Thousands/µL      Absolute Monocytes 0.33 Thousand/µL      Eosinophils Absolute 0.01 Thousand/µL      Basophils Absolute 0.02 Thousands/µL     Narrative:      This is an appended report.  These results have been appended to a previously verified report.    Smear Review(Phlebs Do Not Order) [[id number]] Collected: 02/26/25 2215    Lab Status: Final result Specimen: Blood from Arm, Right Updated: 02/26/25 2334     RBC Morphology Present     Platelet Estimate Adequate     Large Platelet Present     Polychromasia Present    FLU/COVID Rapid Antigen (30 min. TAT) - Preferred screening test in ED [[id number]]  (Normal) Collected: 02/26/25 2215    Lab Status: Final result Specimen: Nares from Nose Updated: 02/26/25 2238     SARS COV Rapid Antigen Negative     Influenza A Rapid Antigen Negative     Influenza B Rapid Antigen Negative    Narrative:      This test has been performed using the [employer] Teetee 2 FLU+SARS Antigen test under the Emergency Use Authorization (EUA). This test has been validated by the  and verified by the performing laboratory. The Teetee uses lateral flow immunofluorescent sandwich assay to detect SARS-COV, Influenza A and Influenza B Antigen.     The [employer] Teetee 2 SARS Antigen test does not differentiate between  SARS-CoV and SARS-CoV-2.     Negative results are presumptive and may be confirmed with a molecular assay, if necessary, for patient management. Negative results do not rule out SARS-CoV-2 or influenza infection and should not be used as the sole basis for treatment or patient management decisions. A negative test result may occur if the level of antigen in a sample is below the limit of detection of this test.     Positive results are indicative of the presence of viral antigens, but do not rule out bacterial infection or co-infection with other viruses.     All test results should be used as an adjunct to clinical observations and other information available to the provider.    FOR PEDIATRIC PATIENTS - copy/paste COVID Guidelines URL to browser: [email or web address]    Basic metabolic panel [[id number]]  (Abnormal) Collected: 02/26/25 2215    Lab Status: Final result Specimen: Blood from Arm, Right Updated: 02/26/25 2238     Sodium 137 mmol/L      Potassium 4.0 mmol/L      Chloride 104 mmol/L      CO2 23 mmol/L      ANION GAP 10 mmol/L      BUN 11 mg/dL      Creatinine 0.39 mg/dL      Glucose 102 mg/dL      Calcium 9.3 mg/dL      eGFR 144 ml/min/1.73sq m     Narrative:      National Kidney Disease Foundation guidelines for Chronic Kidney Disease (CKD):     Stage 1 with normal or high GFR (GFR > 90 mL/min/1.73 square meters)    Stage 2 Mild CKD (GFR = 60-89 mL/min/1.73 square meters)    Stage 3A Moderate CKD (GFR = 45-59 mL/min/1.73 square meters)    Stage 3B Moderate CKD (GFR = 30-44 mL/min/1.73 square meters)    Stage 4 Severe CKD (GFR = 15-29 mL/min/1.73 square meters)    Stage 5 End Stage CKD (GFR <15 mL/min/1.73 square meters)  Note: GFR calculation is accurate only with a steady state creatinine            No orders to display       Procedures    ED Medication and Procedure Management   Prior to Admission Medications   Prescriptions Last Dose Informant  Patient Reported? Taking?   fluticasone (FLONASE) 50 mcg/act nasal spray   Yes No   levothyroxine 75 mcg tablet   Yes No   Sig: Take 75 mcg by mouth daily   loratadine (CLARITIN) 10 mg tablet   Yes No   Sig: Take 10 mg by mouth daily   naproxen (Naprosyn) 500 mg tablet   No No   Sig: Take 1 tablet (500 mg total) by mouth 2 (two) times a day with meals for 7 days   sertraline (ZOLOFT) 50 mg tablet   Yes No   Sig: Take 50 mg by mouth daily      Facility-Administered Medications: None     Current Discharge Medication List        START taking these medications    Details   ondansetron (ZOFRAN-ODT) 4 mg disintegrating tablet Take 1 tablet (4 mg total) by mouth every 8 (eight) hours as needed for nausea or vomiting  Qty: 20 tablet, Refills: 0    Associated Diagnoses: Nausea and vomiting           CONTINUE these medications which have NOT CHANGED    Details   fluticasone (FLONASE) 50 mcg/act nasal spray       levothyroxine 75 mcg tablet Take 75 mcg by mouth daily      loratadine (CLARITIN) 10 mg tablet Take 10 mg by mouth daily      naproxen (Naprosyn) 500 mg tablet Take 1 tablet (500 mg total) by mouth 2 (two) times a day with meals for 7 days  Qty: 14 tablet, Refills: 0    Associated Diagnoses: Allergic reaction, initial encounter      sertraline (ZOLOFT) 50 mg tablet Take 50 mg by mouth daily           No discharge procedures on file.  ED SEPSIS DOCUMENTATION   Time reflects when diagnosis was documented in both MDM as applicable and the Disposition within this note       Time User Action Codes Description Comment    2/26/2025 11:36 PM Sammy Thurston Add [R11.2] Nausea and vomiting                  Sammy Thurston, DO  02/26/25 8655

## 2025-05-09 ENCOUNTER — OFFICE VISIT (OUTPATIENT)
Dept: URGENT CARE | Facility: CLINIC | Age: 30
End: 2025-05-09
Payer: COMMERCIAL

## 2025-05-09 VITALS
TEMPERATURE: 96.7 F | HEIGHT: 59 IN | HEART RATE: 102 BPM | WEIGHT: 162.8 LBS | SYSTOLIC BLOOD PRESSURE: 122 MMHG | OXYGEN SATURATION: 99 % | DIASTOLIC BLOOD PRESSURE: 70 MMHG | BODY MASS INDEX: 32.82 KG/M2 | RESPIRATION RATE: 18 BRPM

## 2025-05-09 DIAGNOSIS — H10.33 ACUTE BACTERIAL CONJUNCTIVITIS OF BOTH EYES: Primary | ICD-10-CM

## 2025-05-09 PROCEDURE — G0382 LEV 3 HOSP TYPE B ED VISIT: HCPCS | Performed by: PHYSICIAN ASSISTANT

## 2025-05-09 PROCEDURE — S9083 URGENT CARE CENTER GLOBAL: HCPCS | Performed by: PHYSICIAN ASSISTANT

## 2025-05-09 RX ORDER — ACETAMINOPHEN 160 MG
TABLET,DISINTEGRATING ORAL
COMMUNITY
Start: 2025-05-06

## 2025-05-09 RX ORDER — ERYTHROMYCIN 5 MG/G
0.5 OINTMENT OPHTHALMIC
Qty: 3.5 G | Refills: 0 | Status: SHIPPED | OUTPATIENT
Start: 2025-05-09

## 2025-05-09 NOTE — PROGRESS NOTES
"[facility]        NAME: Kelle Tobias is a 30 y.o. female  : 1995    MRN: [id number]  DATE: May 9, 2025  TIME: 1:31 PM    /70   Pulse 102   Temp (!) 96.7 °F (35.9 °C)   Resp 18   Ht 4' 11\" (1.499 m)   Wt 73.8 kg (162 lb 12.8 oz)   LMP 2024 Comment: Trying to become pregant  SpO2 99%   BMI 32.88 kg/m²     Assessment and Plan   Acute bacterial conjunctivitis of both eyes [H10.33]  1. Acute bacterial conjunctivitis of both eyes  erythromycin (ILOTYCIN) ophthalmic ointment            Patient Instructions       Follow up with PCP in 3-5 days.  Proceed to  ER if symptoms worsen.    Chief Complaint     Chief Complaint   Patient presents with    Eye Problem     Eye irritation - right eye, started this morning - itchy, red, and discharge          History of Present Illness       Pt with 1 days eye irritation and awoke with bilat eye d/c, pt reports her vision is her normal     Eye Problem   Associated symptoms include an eye discharge and eye redness.       Review of Systems   Review of Systems   Constitutional: Negative.    HENT: Negative.     Eyes:  Positive for discharge and redness.   Respiratory: Negative.     Cardiovascular: Negative.    Gastrointestinal: Negative.    Endocrine: Negative.    Genitourinary: Negative.    Musculoskeletal: Negative.    Skin: Negative.    Allergic/Immunologic: Negative.    Neurological: Negative.    Hematological: Negative.    Psychiatric/Behavioral: Negative.     All other systems reviewed and are negative.        Current Medications       Current Outpatient Medications:     Cholecalciferol (Vitamin D3) 50 MCG ( UT) capsule, Start: 25 11:52:00 AM EDT, 5000 units, PO, Daily, Disp: , Rfl:     erythromycin (ILOTYCIN) ophthalmic ointment, Administer 0.5 inches to both eyes daily at bedtime, Disp: 3.5 g, Rfl: 0    levothyroxine 75 mcg tablet, Take 75 mcg by mouth daily, Disp: , Rfl:     ondansetron (ZOFRAN-ODT) 4 mg disintegrating tablet, " "Take 1 tablet (4 mg total) by mouth every 8 (eight) hours as needed for nausea or vomiting, Disp: 20 tablet, Rfl: 0    Ferrous Sulfate (IRON PO), Take 1 tablet by mouth daily, Disp: , Rfl:     fluticasone (FLONASE) 50 mcg/act nasal spray, , Disp: , Rfl:     loratadine (CLARITIN) 10 mg tablet, Take 10 mg by mouth daily (Patient not taking: Reported on 5/9/2025), Disp: , Rfl:     naproxen (Naprosyn) 500 mg tablet, Take 1 tablet (500 mg total) by mouth 2 (two) times a day with meals for 7 days, Disp: 14 tablet, Rfl: 0    sertraline (ZOLOFT) 50 mg tablet, Take 50 mg by mouth daily, Disp: , Rfl:     Current Allergies     Allergies as of 05/09/2025 - Reviewed 05/09/2025   Allergen Reaction Noted    Nuts - food allergy Itching 03/17/2023    Shellfish-derived products - food allergy Itching 03/17/2023    Soybean-containing drug products - food allergy Itching 03/17/2023    Guzman butter - food allergy Lip Swelling 06/25/2024            The following portions of the patient's history were reviewed and updated as appropriate: allergies, current medications, past family history, past medical history, past social history, past surgical history and problem list.     Past Medical History:   Diagnosis Date    Thyroid disease        Past Surgical History:   Procedure Laterality Date    ABDOMINAL SURGERY         History reviewed. No pertinent family history.      Medications have been verified.        Objective   /70   Pulse 102   Temp (!) 96.7 °F (35.9 °C)   Resp 18   Ht 4' 11\" (1.499 m)   Wt 73.8 kg (162 lb 12.8 oz)   LMP 05/31/2024 Comment: Trying to become pregant  SpO2 99%   BMI 32.88 kg/m²        Physical Exam     Physical Exam  Vitals and nursing note reviewed.   Constitutional:       Appearance: Normal appearance. She is normal weight.   HENT:      Head: Normocephalic and atraumatic.      Right Ear: Tympanic membrane, ear canal and external ear normal.      Left Ear: Tympanic membrane, ear canal and external ear " normal.      Nose: Nose normal.      Mouth/Throat:      Mouth: Mucous membranes are moist.      Pharynx: Oropharynx is clear.   Eyes:      Extraocular Movements: Extraocular movements intact.      Pupils: Pupils are equal, round, and reactive to light.      Comments: Conj injected bilat perrl  eom wnl yellow d/c seen right side  lids wnl no f/o seen   Cardiovascular:      Rate and Rhythm: Normal rate and regular rhythm.      Pulses: Normal pulses.      Heart sounds: Normal heart sounds.   Pulmonary:      Breath sounds: Normal breath sounds.   Musculoskeletal:         General: Normal range of motion.      Cervical back: Normal range of motion and neck supple.   Skin:     General: Skin is warm.   Neurological:      Mental Status: She is alert.

## 2025-08-09 ENCOUNTER — OFFICE VISIT (OUTPATIENT)
Dept: URGENT CARE | Facility: CLINIC | Age: 30
End: 2025-08-09
Payer: COMMERCIAL

## 2025-08-09 VITALS
TEMPERATURE: 97.1 F | SYSTOLIC BLOOD PRESSURE: 124 MMHG | HEART RATE: 91 BPM | BODY MASS INDEX: 30.24 KG/M2 | RESPIRATION RATE: 18 BRPM | OXYGEN SATURATION: 98 % | WEIGHT: 150 LBS | DIASTOLIC BLOOD PRESSURE: 83 MMHG | HEIGHT: 59 IN

## 2025-08-09 DIAGNOSIS — T81.49XA INFECTED SURGICAL WOUND: Primary | ICD-10-CM

## 2025-08-09 PROCEDURE — S9083 URGENT CARE CENTER GLOBAL: HCPCS

## 2025-08-09 PROCEDURE — G0383 LEV 4 HOSP TYPE B ED VISIT: HCPCS

## 2025-08-09 RX ORDER — OXYCODONE AND ACETAMINOPHEN 5; 325 MG/1; MG/1
1 TABLET ORAL EVERY 4 HOURS PRN
COMMUNITY
Start: 2025-08-07

## 2025-08-09 RX ORDER — EPINEPHRINE 0.3 MG/.3ML
INJECTION SUBCUTANEOUS
COMMUNITY
Start: 2025-06-27